# Patient Record
Sex: FEMALE | Employment: UNEMPLOYED | URBAN - METROPOLITAN AREA
[De-identification: names, ages, dates, MRNs, and addresses within clinical notes are randomized per-mention and may not be internally consistent; named-entity substitution may affect disease eponyms.]

---

## 2017-03-05 ENCOUNTER — HOSPITAL ENCOUNTER (EMERGENCY)
Facility: CLINIC | Age: 52
Discharge: HOME | End: 2017-03-05
Attending: FAMILY MEDICINE | Admitting: FAMILY MEDICINE
Payer: COMMERCIAL

## 2017-03-05 ENCOUNTER — APPOINTMENT (OUTPATIENT)
Dept: CT IMAGING | Facility: CLINIC | Age: 52
End: 2017-03-05
Attending: FAMILY MEDICINE
Payer: COMMERCIAL

## 2017-03-05 VITALS
TEMPERATURE: 98 F | OXYGEN SATURATION: 100 % | DIASTOLIC BLOOD PRESSURE: 83 MMHG | WEIGHT: 180 LBS | HEART RATE: 86 BPM | RESPIRATION RATE: 16 BRPM | SYSTOLIC BLOOD PRESSURE: 138 MMHG

## 2017-03-05 DIAGNOSIS — S09.90XA CLOSED HEAD INJURY, INITIAL ENCOUNTER: ICD-10-CM
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
## 2017-03-05 PROCEDURE — 99284 EMERGENCY DEPT VISIT MOD MDM: CPT | Mod: Z6 | Performed by: FAMILY MEDICINE

## 2017-03-05 PROCEDURE — 70450 CT HEAD/BRAIN W/O DYE: CPT

## 2017-03-05 PROCEDURE — 99284 EMERGENCY DEPT VISIT MOD MDM: CPT | Mod: 25 | Performed by: FAMILY MEDICINE

## 2017-03-05 RX ORDER — ONDANSETRON 4 MG/1
4 TABLET, ORALLY DISINTEGRATING ORAL EVERY 6 HOURS PRN
Qty: 6 TABLET | Refills: 0 | Status: SHIPPED | OUTPATIENT
Start: 2017-03-05

## 2017-03-05 NOTE — ED AVS SNAPSHOT
[facility], Emergency Department    [address]    [facility] [place] [address]    Phone:  [phone]    Fax:  [phone]                                       Nadia Myers   MRN: [id number]    Department:  [facility], Emergency Department   Date of Visit:  3/5/2017           After Visit Summary Signature Page     I have received my discharge instructions, and my questions have been answered. I have discussed any challenges I see with this plan with the nurse or doctor.    ..........................................................................................................................................  Patient/Patient Representative Signature      ..........................................................................................................................................  Patient Representative Print Name and Relationship to Patient    ..................................................               ................................................  Date                                            Time    ..........................................................................................................................................  Reviewed by Signature/Title    ...................................................              ..............................................  Date                                                            Time

## 2017-03-05 NOTE — ED AVS SNAPSHOT
[facility], Emergency Department    [address]    [place] [place] [place] [address]    Phone:  [phone]    Fax:  [phone]                                       Nadia Myers   MRN: [id number]    Department:  [facility], Emergency Department   Date of Visit:  3/5/2017           Patient Information     Date Of Birth          1965        Your diagnoses for this visit were:     Closed head injury, initial encounter        You were seen by Jeremy Cerda MD.        Discharge Instructions       Home to rest and relax  If needed for nausea- zofran  If needed for headache tylenol or advil  No vigorous exercise for at least one week  Eat light for several days  If persistent nausea and vomiting return  If numbness or weakness or vision changes or balance difficulties return immediately    24 Hour Appointment Hotline       To make an appointment at any [facility], call [phone] ([phone]). If you don't have a family doctor or clinic, we will help you find one. [facility] clinics are conveniently located to serve the needs of you and your family.             Review of your medicines      START taking        Dose / Directions Last dose taken    ondansetron 4 MG ODT tab   Commonly known as:  ZOFRAN ODT   Dose:  4 mg   Quantity:  6 tablet        Take 1 tablet (4 mg) by mouth every 6 hours as needed for nausea   Refills:  0                Prescriptions were sent or printed at these locations (1 Prescription)                   Other Prescriptions                Printed at Department/Unit printer (1 of 1)         ondansetron (ZOFRAN ODT) 4 MG ODT tab                Procedures and tests performed during your visit     Head CT w/o contrast      Orders Needing Specimen Collection     None      Pending Results     Date and Time Order Name Status Description    3/5/2017 2007 Head CT w/o contrast Preliminary             Pending Culture Results     No orders found from 3/3/2017 to 3/6/2017.           "  Thank you for choosing [facility]       Thank you for choosing [facility] for your care. Our goal is always to provide you with excellent care. Hearing back from our patients is one way we can continue to improve our services. Please take a few minutes to complete the written survey that you may receive in the mail after you visit with us. Thank you!        [facility] Information     [employer] lets you send messages to your doctor, view your test results, renew your prescriptions, schedule appointments and more. To sign up, go to [email or web address] . Click on \"Log in\" on the left side of the screen, which will take you to the Welcome page. Then click on \"Sign up Now\" on the right side of the page.     You will be asked to enter the access code listed below, as well as some personal information. Please follow the directions to create your username and password.     Your access code is: [email or web address]  Expires: 6/3/2017  9:11 PM     Your access code will  in 90 days. If you need help or a new code, please call your [facility] clinic or [phone].        Care EveryWhere ID     This is your Care EveryWhere ID. This could be used by other organizations to access your [facility] medical records  [id number]        After Visit Summary       This is your record. Keep this with you and show to your community pharmacist(s) and doctor(s) at your next visit.                  "

## 2017-03-06 ASSESSMENT — ENCOUNTER SYMPTOMS
CHILLS: 0
HEMATOLOGIC/LYMPHATIC NEGATIVE: 1
DIZZINESS: 0
FACIAL ASYMMETRY: 0
NUMBNESS: 0
SEIZURES: 0
VOMITING: 0
TREMORS: 0
SPEECH DIFFICULTY: 0
BACK PAIN: 0
LIGHT-HEADEDNESS: 1
HEADACHES: 1
NECK PAIN: 0
WEAKNESS: 0
FEVER: 0
NAUSEA: 0

## 2017-03-06 NOTE — ED NOTES
Bed: ED03  Expected date:   Expected time:   Means of arrival:   Comments:  [address]    52 yo F   syncopy

## 2017-03-06 NOTE — DISCHARGE INSTRUCTIONS
Home to rest and relax  If needed for nausea- zofran  If needed for headache tylenol or advil  No vigorous exercise for at least one week  Eat light for several days  If persistent nausea and vomiting return  If numbness or weakness or vision changes or balance difficulties return immediately

## 2017-03-07 NOTE — ED PROVIDER NOTES
"  History     Chief Complaint   Patient presents with     Fall     per pt she was ice skating today & around 7 PM she fell in the ice rink backwards hit her head, denies LOC, felt whole upper body got numb, feels dizzy when moving head     HPI  Nadia Myers is a 51 year old female who feel ice skating. Striking the occiput. No loc but felt \"everything went numb\" for 5 seconds. After no numbness or weakness or memory or speech problems. Feels slightly off balance and has mod ha. NO neck pain or back pain.    I have reviewed the Medications, Allergies, Past Medical and Surgical History, and Social History in the Epic system.  Healthy with no medical problems    Review of Systems   Constitutional: Negative for chills and fever.   Gastrointestinal: Negative for nausea and vomiting.   Musculoskeletal: Negative for back pain and neck pain.   Allergic/Immunologic: Negative for immunocompromised state.   Neurological: Positive for light-headedness and headaches. Negative for dizziness, tremors, seizures, syncope, facial asymmetry, speech difficulty, weakness and numbness.   Hematological: Negative.        Physical Exam   BP: 138/83  Pulse: 86  Temp: 98  F (36.7  C)  Resp: 16  Weight: 81.6 kg (180 lb)  SpO2: 100 %  Physical Exam   Constitutional: She is oriented to person, place, and time. She appears well-developed and well-nourished. No distress.   HENT:   Head: Normocephalic.   Tenderness and edema over the occiput  TMs are normal- hearing grossly normal   Eyes: Pupils are equal, round, and reactive to light.   Neck: Normal range of motion. Neck supple.   Cardiovascular: Normal rate and regular rhythm.    Pulmonary/Chest: No respiratory distress.   Lymphadenopathy:     She has no cervical adenopathy.   Neurological: She is alert and oriented to person, place, and time.   Tandem gait normal  Rapid finger movements normal  F to N and H to S are normal  Mentation normal  Speech normal   Skin: She is not " diaphoretic.   Nursing note and vitals reviewed.      ED Course     ED Course     Procedures        Fall today on ice. Neurologically intact  CT shows no intracranial issues  Labs Ordered and Resulted from Time of ED Arrival Up to the Time of Departure from the ED - No data to display    Assessments & Plan (with Medical Decision Making)   Concussive syndrome from fall today- mild    I have reviewed the nursing notes.    I have reviewed the findings, diagnosis, plan and need for follow up with the patient.    Discharge Medication List as of 3/5/2017  9:17 PM      START taking these medications    Details   ondansetron (ZOFRAN ODT) 4 MG ODT tab Take 1 tablet (4 mg) by mouth every 6 hours as needed for nausea, Disp-6 tablet, R-0, Local Print             Final diagnoses:   Closed head injury, initial encounter       3/5/2017   [facility], [place], EMERGENCY DEPARTMENT     Jeremy Cerda MD  03/06/17 [id number]